# Patient Record
Sex: MALE | Race: WHITE | NOT HISPANIC OR LATINO | URBAN - METROPOLITAN AREA
[De-identification: names, ages, dates, MRNs, and addresses within clinical notes are randomized per-mention and may not be internally consistent; named-entity substitution may affect disease eponyms.]

---
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
## 2022-03-04 ENCOUNTER — OFFICE VISIT (OUTPATIENT)
Dept: MEDICAL GROUP | Facility: CLINIC | Age: 22
End: 2022-03-04
Payer: COMMERCIAL

## 2022-03-04 VITALS
BODY MASS INDEX: 25.2 KG/M2 | HEIGHT: 71 IN | HEART RATE: 66 BPM | RESPIRATION RATE: 16 BRPM | DIASTOLIC BLOOD PRESSURE: 79 MMHG | WEIGHT: 180 LBS | SYSTOLIC BLOOD PRESSURE: 122 MMHG | OXYGEN SATURATION: 96 % | TEMPERATURE: 98 F

## 2022-03-04 DIAGNOSIS — F41.9 ANXIETY: ICD-10-CM

## 2022-03-04 PROCEDURE — 99203 OFFICE O/P NEW LOW 30 MIN: CPT | Performed by: FAMILY MEDICINE

## 2022-03-04 ASSESSMENT — PATIENT HEALTH QUESTIONNAIRE - PHQ9
5. POOR APPETITE OR OVEREATING: 1 - SEVERAL DAYS
CLINICAL INTERPRETATION OF PHQ2 SCORE: 4
SUM OF ALL RESPONSES TO PHQ QUESTIONS 1-9: 8

## 2022-03-04 NOTE — ASSESSMENT & PLAN NOTE
After discussion of risks and benefits, have decided to start sertraline 50 mg 1 p.o. daily.  I offered a referral to psychology as well, he would like to wait.  He may look into it on his own and I recommended [facility].  Follow-up in 1 to 2 months, call sooner if new issues or concerns.    I did also discuss aerobic exercise, diet and cautioned about self-medication.

## 2022-03-04 NOTE — PROGRESS NOTES
"Subjective:     CC:  The encounter diagnosis was Anxiety.    HISTORY OF THE PRESENT ILLNESS: Patient is a 21 y.o. male. This pleasant patient is here today to establish care and discuss the following issue:.       Problem   Anxiety    Longstanding history of anxiety.  This seems to run in his family as well.  He sought help last year and was treated first with fluoxetine, then with what sounds like bupropion.  These worked reasonably well but did affect his sleep and he eventually stopped them.  Now is noticing more anxiety and would like to discuss treatment again.  He did have therapy as well previously and found that somewhat helpful  Appetite is reasonable.  No SI or HI.  Social alcohol, occasional marijuana.    Family Hx:   Sister: Crohns, depression  Mother: anemia, anxiety           Current Outpatient Medications Ordered in Epic   Medication Sig Dispense Refill   • sertraline (ZOLOFT) 50 MG Tab Take 1 Tablet by mouth every day. 30 Tablet 11     No current [facility]-ordered facility-administered medications on file.       Health Maintenance:     ROS:   Gen: no fevers/chills, no changes in weight  Eyes: no changes in vision  ENT: no sore throat, no hearing loss, no bloody nose  Pulm: no sob, no cough  CV: no chest pain, no palpitations  GI: no nausea/vomiting, no diarrhea  : no dysuria  MSk: no myalgias  Skin: no rash  Neuro: no headaches, no numbness/tingling  Heme/Lymph: no easy bruising      Objective:       Exam: /79   Pulse 66   Temp 36.7 °C (98 °F)   Resp 16   Ht 1.803 m (5' 11\")   Wt 81.6 kg (180 lb)   SpO2 96%  Body mass index is 25.1 kg/m².    General: Normal appearing. No distress.  HEENT: Normocephalic. Eyes conjunctiva clear lids without ptosis, pupils equal and reactive to light accommodation, ears normal shape and contour, canals are clear bilaterally, tympanic membranes are benign, nasal mucosa benign, oropharynx is without erythema, edema or exudates.   Neck: Supple without JVD or " bruit. Thyroid is not enlarged.  Pulmonary: Clear to ausculation.  Normal effort. No rales, ronchi, or wheezing.  Cardiovascular: Regular rate and rhythm without murmur. Carotid and radial pulses are intact and equal bilaterally.  Abdomen: Soft, nontender, nondistended. Normal bowel sounds. Liver and spleen are not palpable  Neurologic: Grossly nonfocal  Lymph: No cervical or supraclavicular lymph nodes are palpable  Skin: Warm and dry.  No obvious lesions.  Musculoskeletal: Normal gait. No extremity cyanosis, clubbing, or edema.  Psych: Normal mood and affect. Alert and oriented x3. Judgment and insight is normal.              Assessment & Plan:   21 y.o. male with the following -    Problem List Items Addressed This Visit     Anxiety     After discussion of risks and benefits, have decided to start sertraline 50 mg 1 p.o. daily.  I offered a referral to psychology as well, he would like to wait.  He may look into it on his own and I recommended [place] [facility].  Follow-up in 1 to 2 months, call sooner if new issues or concerns.    I did also discuss aerobic exercise, diet and cautioned about self-medication.         Relevant Medications    sertraline (ZOLOFT) 50 MG Tab              No follow-ups on file.    Please note that this dictation was created using voice recognition software. I have made every reasonable attempt to correct obvious errors, but I expect that there are errors of grammar and possibly content that I did not discover before finalizing the note.

## 2022-03-10 NOTE — PROGRESS NOTES
Results Reviewed and can further be discussed at 2/20 clinic visit. Please let the patient know that diabetes is well controlled, A1C is at goal of <7%. Kidney function is stable at 53% and we will discuss further management when I see her. Hgb/blood count, magnesium, phosphorus, fasting cholesterol 90, urine protein levels, PTH/bone hormone levels are at goal. Follow up as scheduled on 2/20 to discuss results Subjective:   Al Badillo is a 19 y.o. male who presents for Nasal Congestion (cough, dry, fever, chills x 2 days)        Patient presents with sudden onset of dry cough, fevers, chills, headaches x2 days.    Cough   This is a new problem. The current episode started in the past 7 days. The problem has been unchanged. The problem occurs constantly. The cough is non-productive. Associated symptoms include chills, a fever, headaches, myalgias, nasal congestion, postnasal drip, rhinorrhea and a sore throat. Pertinent negatives include no chest pain, ear congestion, ear pain, shortness of breath, weight loss or wheezing. Nothing aggravates the symptoms. Treatments tried: rest, fluids, nsaids. There is no history of asthma, bronchitis, COPD or environmental allergies.     Review of Systems   Constitutional: Positive for chills and fever. Negative for weight loss.   HENT: Positive for postnasal drip, rhinorrhea and sore throat. Negative for ear pain.    Respiratory: Positive for cough. Negative for shortness of breath and wheezing.    Cardiovascular: Negative for chest pain.   Musculoskeletal: Positive for myalgias.   Neurological: Positive for headaches.   Endo/Heme/Allergies: Negative for environmental allergies.       PMH:  has no past medical history of Asthma.  MEDS:   Current Outpatient Medications:   •  oseltamivir (TAMIFLU) 75 MG Cap, Take 1 Cap by mouth 2 times a day., Disp: 10 Cap, Rfl: 0  •  guaiFENesin ER (MUCINEX) 600 MG TABLET SR 12 HR, Take 1 Tab by mouth every 12 hours., Disp: 28 Tab, Rfl: 0  •  fluticasone (FLONASE) 50 MCG/ACT nasal spray, Spray 1 Spray in nose every day., Disp: 16 g, Rfl: 0  •  cetirizine (ZYRTEC) 10 MG Tab, Take 10 mg by mouth every day., Disp: , Rfl:   ALLERGIES:   Allergies   Allergen Reactions   • Amoxicillin Rash     rash     SURGHX: History reviewed. No pertinent surgical history.  SOCHX:  reports that he has never smoked. He has never used smokeless tobacco. He reports  "that he does not drink alcohol or use drugs.  FH: Family history was reviewed, no pertinent findings to report   Objective:   /70 (BP Location: Left arm, Patient Position: Sitting, BP Cuff Size: Adult)   Pulse 71   Temp 37.2 °C (98.9 °F) (Tympanic)   Ht 1.803 m (5' 11\")   Wt 78.9 kg (174 lb)   SpO2 95%   BMI 24.27 kg/m²   Physical Exam  Vitals signs reviewed.   Constitutional:       General: He is not in acute distress.     Appearance: Normal appearance. He is well-developed. He is not toxic-appearing.   HENT:      Head: Normocephalic and atraumatic.      Right Ear: Tympanic membrane, ear canal and external ear normal.      Left Ear: Tympanic membrane, ear canal and external ear normal.      Nose: Mucosal edema and rhinorrhea present. No congestion. Rhinorrhea is clear.      Mouth/Throat:      Lips: Pink.      Mouth: Mucous membranes are moist.      Pharynx: Oropharynx is clear. Uvula midline. Posterior oropharyngeal erythema present.   Eyes:      General: Lids are normal.      Conjunctiva/sclera: Conjunctivae normal.   Neck:      Musculoskeletal: Neck supple.   Cardiovascular:      Rate and Rhythm: Normal rate and regular rhythm.      Heart sounds: Normal heart sounds, S1 normal and S2 normal. No murmur. No friction rub. No gallop.    Pulmonary:      Effort: Pulmonary effort is normal. No respiratory distress.      Breath sounds: Normal breath sounds. No decreased breath sounds, wheezing, rhonchi or rales.   Lymphadenopathy:      Cervical: No cervical adenopathy.      Right cervical: No superficial or posterior cervical adenopathy.     Left cervical: No superficial or posterior cervical adenopathy.   Skin:     General: Skin is warm and dry.      Capillary Refill: Capillary refill takes less than 2 seconds.   Neurological:      Mental Status: He is alert and oriented to person, place, and time.      Cranial Nerves: No cranial nerve deficit.      Sensory: No sensory deficit.   Psychiatric:         Speech: " Speech normal.         Behavior: Behavior normal.         Thought Content: Thought content normal.         Judgment: Judgment normal.           Assessment/Plan:   1. Flu-like symptoms  - oseltamivir (TAMIFLU) 75 MG Cap; Take 1 Cap by mouth 2 times a day.  Dispense: 10 Cap; Refill: 0  - guaiFENesin ER (MUCINEX) 600 MG TABLET SR 12 HR; Take 1 Tab by mouth every 12 hours.  Dispense: 28 Tab; Refill: 0  - fluticasone (FLONASE) 50 MCG/ACT nasal spray; Spray 1 Spray in nose every day.  Dispense: 16 g; Refill: 0    2. Fever and chills  - POCT Influenza A/B    Pt presentation consistent with influenza, high prevalence in community currently, and clinical suspicion is high.    VSS, no dyspnea, no SOB, and lungs CTA on PE.  Goals of care include symptomatic control and prevention of lower respiratory spread. Signs of lower respiratory involvement discussed with pt.  Pt instructed to RTC if any of these are observed.     Drink plenty of fluids and rest.  Use nasal saline TID to promote drainage.   Salt water gurgles to soothe sore throat.  Start OTC expectorant.  APAP for fever control, and NSAIDs for throat pain/headache relief prn.    If you fail to improve in 3-5 days or symptoms worsen/new symptoms develop, RTC for reevaluation.    If symptoms worsen or new symptoms develop to include uncontrolled fevers, dyspnea, SOB, or vomiting- pt was instructed to go to the ED for evaluation.    Differential diagnosis, natural history, supportive care, and indications for immediate follow-up discussed.   none

## 2022-04-13 ENCOUNTER — OFFICE VISIT (OUTPATIENT)
Dept: MEDICAL GROUP | Facility: CLINIC | Age: 22
End: 2022-04-13
Payer: COMMERCIAL

## 2022-04-13 VITALS
WEIGHT: 181.6 LBS | HEIGHT: 72 IN | TEMPERATURE: 97.8 F | HEART RATE: 70 BPM | RESPIRATION RATE: 16 BRPM | DIASTOLIC BLOOD PRESSURE: 70 MMHG | BODY MASS INDEX: 24.6 KG/M2 | OXYGEN SATURATION: 98 % | SYSTOLIC BLOOD PRESSURE: 118 MMHG

## 2022-04-13 DIAGNOSIS — F41.9 ANXIETY: ICD-10-CM

## 2022-04-13 PROCEDURE — 99213 OFFICE O/P EST LOW 20 MIN: CPT | Performed by: FAMILY MEDICINE

## 2022-04-13 NOTE — ASSESSMENT & PLAN NOTE
He is happy with the current medicine at the existing dose.  He is welcome to check back with me through my chart if that changes otherwise I should see him at least twice a year.  I did review with him as well that if he wants to stop he should check in with me and we will taper it.  There are no other screening or preventive issues I would address right now.  He does not feel any need for psychology/counseling but we will reapproach that if necessary.  His lifestyle choices seem healthy as well.

## 2022-04-13 NOTE — PROGRESS NOTES
Subjective:     CC: Anxiety, med management    HPI:   Al presents today to discuss the following issues     Problem   Anxiety    Al feels better on the 50 mg of sertraline.  He states that the change is not dramatic, but that the sense of restlessness and underlying unease is improved.  His sleep is good, and his appetite is acceptable.  He thinks maybe he should eat a bit more than he does but that his diet is pretty reasonable his weight is steady.  My prior note:   Longstanding history of anxiety.  This seems to run in his family as well.  He sought help last year and was treated first with fluoxetine, then with what sounds like bupropion.  These worked reasonably well but did affect his sleep and he eventually stopped them.  Now is noticing more anxiety and would like to discuss treatment again.  He did have therapy as well previously and found that somewhat helpful  Appetite is reasonable.  No SI or HI.  Social alcohol, occasional marijuana.               Current Outpatient Medications Ordered in Epic   Medication Sig Dispense Refill   • sertraline (ZOLOFT) 50 MG Tab TAKE 1 TABLET BY MOUTH EVERY DAY 90 Tablet 3     No current Epic-ordered facility-administered medications on file.       Health Maintenance:     ROS:  Gen: no fevers/chills, no changes in weight  Eyes: no changes in vision  ENT: no sore throat, no hearing loss, no bloody nose  Pulm: no sob, no cough  CV: no chest pain, no palpitations  GI: no nausea/vomiting, no diarrhea  : no dysuria  MSk: no myalgias  Skin: no rash  Neuro: no headaches, no numbness/tingling  Heme/Lymph: no easy bruising      Objective:     Exam:  /70 (BP Location: Right arm, Patient Position: Sitting, BP Cuff Size: Adult)   Pulse 70   Temp 36.6 °C (97.8 °F) (Temporal)   Resp 16   Ht 1.829 m (6')   Wt 82.4 kg (181 lb 9.6 oz)   SpO2 98%   BMI 24.63 kg/m²  Body mass index is 24.63 kg/m².    Gen: Alert and oriented, No apparent distress.  Neck: Neck is supple  without lymphadenopathy.  Lungs: Normal effort, CTA bilaterally, no wheezes, rhonchi, or rales  CV: Regular rate and rhythm. No murmurs, rubs, or gallops.  Ext: No clubbing, cyanosis, edema.          Assessment & Plan:     21 y.o. male with the following -     Problem List Items Addressed This Visit     Anxiety     He is happy with the current medicine at the existing dose.  He is welcome to check back with me through my chart if that changes otherwise I should see him at least twice a year.  I did review with him as well that if he wants to stop he should check in with me and we will taper it.  There are no other screening or preventive issues I would address right now.  He does not feel any need for psychology/counseling but we will reapproach that if necessary.  His lifestyle choices seem healthy as well.                   No follow-ups on file.

## 2023-03-08 RX ORDER — SERTRALINE HYDROCHLORIDE 100 MG/1
100 TABLET, FILM COATED ORAL DAILY
Qty: 30 TABLET | Refills: 11 | Status: SHIPPED | OUTPATIENT
Start: 2023-03-08 | End: 2023-03-31

## 2023-03-30 NOTE — TELEPHONE ENCOUNTER
REQUEST FOR 90 DAYS PRESCRIPTION.      Received request via: Pharmacy    Was the patient seen in the last year in this department? Yes    Does the patient have an active prescription (recently filled or refills available) for medication(s) requested? No    Does the patient have [facility] and need 100 day supply (blood pressure, diabetes and cholesterol meds only)? Patient does not have SCP

## 2023-03-31 RX ORDER — SERTRALINE HYDROCHLORIDE 100 MG/1
100 TABLET, FILM COATED ORAL DAILY
Qty: 90 TABLET | Refills: 3 | Status: SHIPPED | OUTPATIENT
Start: 2023-03-31

## 2023-05-31 ENCOUNTER — OFFICE VISIT (OUTPATIENT)
Dept: MEDICAL GROUP | Facility: CLINIC | Age: 23
End: 2023-05-31
Payer: COMMERCIAL

## 2023-05-31 DIAGNOSIS — Z00.00 HEALTH CARE MAINTENANCE: ICD-10-CM

## 2023-05-31 DIAGNOSIS — F41.9 ANXIETY: ICD-10-CM

## 2023-05-31 PROCEDURE — 99213 OFFICE O/P EST LOW 20 MIN: CPT | Performed by: FAMILY MEDICINE

## 2023-05-31 NOTE — ASSESSMENT & PLAN NOTE
I reviewed care gaps and USPSTF preventative screening guidelines.  I am not recommending labs or other screening measures now but we will continue to monitor with him.  Follow-up with me if any new concerns in the meantime.

## 2023-05-31 NOTE — ASSESSMENT & PLAN NOTE
After discussion, we agreed that it would be appropriate to cut back on the sertraline to 50 mg a day.  I would stay on this dose until he establishes with his psychologist.  Discussion with her, he may go ahead and either taper it off over the following month or check back with me if he wants to continue.  He is making positive lifestyle efforts otherwise and I will continue to monitor with him.  Hold on a trial of a different antidepressant for now.

## 2023-05-31 NOTE — PROGRESS NOTES
Subjective:     CC: check up, anxiety    HPI:   Al presents today to discuss the following issues       Problem   Health Care Maintenance    Al is interested in knowing if there are any other preventive or screening measures that should be taken now.  His past medical history is otherwise benign as is his family history.  No new or concerning physical symptoms       Anxiety    Increased Al is sertraline too 100 mg in March at his request.  Not certain this is helping, in fact he feels that some of the edge of anxiety may be a little worse on the medications.  Previously been on fluoxetine and bupropion years ago and did not really tolerate these medicines either.  He wonders about stopping the Zoloft.  He does have a ointment scheduled with a psychologist in about 2 weeks.  Sleeping well, his diet is good he gets good exercise.  He smokes occasional marijuana but no other drugs and no alcohol.  There is no SI or HI    Prior    Al feels better on the 50 mg of sertraline.  He states that the change is not dramatic, but that the sense of restlessness and underlying unease is improved.  His sleep is good, and his appetite is acceptable.  He thinks maybe he should eat a bit more than he does but that his diet is pretty reasonable his weight is steady.  My prior note:   Longstanding history of anxiety.  This seems to run in his family as well.  He sought help last year and was treated first with fluoxetine, then with what sounds like bupropion.  These worked reasonably well but did affect his sleep and he eventually stopped them.  Now is noticing more anxiety and would like to discuss treatment again.  He did have therapy as well previously and found that somewhat helpful  Appetite is reasonable.  No SI or HI.  Social alcohol, occasional marijuana.                 Current Outpatient Medications Ordered in Epic   Medication Sig Dispense Refill    sertraline (ZOLOFT) 100 MG Tab TAKE 1 TABLET BY MOUTH EVERY DAY  "90 Tablet 3     No current Epic-ordered facility-administered medications on file.       Health Maintenance:     ROS:  Gen: no fevers/chills, no changes in weight  Eyes: no changes in vision  ENT: no sore throat, no hearing loss, no bloody nose  Pulm: no sob, no cough  CV: no chest pain, no palpitations  GI: no nausea/vomiting, no diarrhea  : no dysuria  MSk: no myalgias  Skin: no rash  Neuro: no headaches, no numbness/tingling  Heme/Lymph: no easy bruising      Objective:     Exam:  BP (P) 118/72 (BP Location: Left arm, Patient Position: Sitting, BP Cuff Size: Adult)   Pulse (P) 68   Temp (P) 36.2 °C (97.2 °F) (Temporal)   Ht (P) 1.803 m (5' 11\")   Wt (P) 79.5 kg (175 lb 3.2 oz)   SpO2 (P) 98%   BMI (P) 24.44 kg/m²  Body mass index is 24.44 kg/m² (pended).    Gen: Alert and oriented, No apparent distress.  Neck: Neck is supple without lymphadenopathy.  Lungs: Normal effort, CTA bilaterally, no wheezes, rhonchi, or rales  CV: Regular rate and rhythm. No murmurs, rubs, or gallops.  Ext: No clubbing, cyanosis, edema.          Assessment & Plan:     22 y.o. male with the following -     Problem List Items Addressed This Visit       Anxiety     After discussion, we agreed that it would be appropriate to cut back on the sertraline to 50 mg a day.  I would stay on this dose until he establishes with his psychologist.  Discussion with her, he may go ahead and either taper it off over the following month or check back with me if he wants to continue.  He is making positive lifestyle efforts otherwise and I will continue to monitor with him.  Hold on a trial of a different antidepressant for now.           Health care maintenance     I reviewed care gaps and USPSTF preventative screening guidelines.  I am not recommending labs or other screening measures now but we will continue to monitor with him.  Follow-up with me if any new concerns in the meantime.                    No follow-ups on file.            "

## 2023-12-01 ENCOUNTER — APPOINTMENT (OUTPATIENT)
Dept: MEDICAL GROUP | Facility: CLINIC | Age: 23
End: 2023-12-01
Payer: COMMERCIAL

## 2023-12-14 ENCOUNTER — OFFICE VISIT (OUTPATIENT)
Dept: URGENT CARE | Facility: CLINIC | Age: 23
End: 2023-12-14
Payer: COMMERCIAL

## 2023-12-14 VITALS
BODY MASS INDEX: 23.7 KG/M2 | OXYGEN SATURATION: 98 % | HEART RATE: 76 BPM | HEIGHT: 72 IN | TEMPERATURE: 97.3 F | DIASTOLIC BLOOD PRESSURE: 70 MMHG | WEIGHT: 175 LBS | SYSTOLIC BLOOD PRESSURE: 120 MMHG

## 2023-12-14 DIAGNOSIS — J10.1 INFLUENZA A: ICD-10-CM

## 2023-12-14 LAB
FLUAV RNA SPEC QL NAA+PROBE: POSITIVE
FLUBV RNA SPEC QL NAA+PROBE: NEGATIVE
RSV RNA SPEC QL NAA+PROBE: NEGATIVE
SARS-COV-2 RNA RESP QL NAA+PROBE: NEGATIVE

## 2023-12-14 PROCEDURE — 3078F DIAST BP <80 MM HG: CPT | Performed by: PHYSICIAN ASSISTANT

## 2023-12-14 PROCEDURE — 3074F SYST BP LT 130 MM HG: CPT | Performed by: PHYSICIAN ASSISTANT

## 2023-12-14 PROCEDURE — 99213 OFFICE O/P EST LOW 20 MIN: CPT | Performed by: PHYSICIAN ASSISTANT

## 2023-12-14 PROCEDURE — 0241U POCT CEPHEID COV-2, FLU A/B, RSV - PCR: CPT | Performed by: PHYSICIAN ASSISTANT

## 2023-12-14 RX ORDER — DEXTROMETHORPHAN HYDROBROMIDE AND PROMETHAZINE HYDROCHLORIDE 15; 6.25 MG/5ML; MG/5ML
5 SYRUP ORAL EVERY 4 HOURS PRN
Qty: 120 ML | Refills: 0 | Status: SHIPPED | OUTPATIENT
Start: 2023-12-14

## 2023-12-14 RX ORDER — OSELTAMIVIR PHOSPHATE 75 MG/1
75 CAPSULE ORAL 2 TIMES DAILY
Qty: 10 CAPSULE | Refills: 0 | Status: SHIPPED | OUTPATIENT
Start: 2023-12-14 | End: 2023-12-19

## 2023-12-14 RX ORDER — OSELTAMIVIR PHOSPHATE 75 MG/1
75 CAPSULE ORAL 2 TIMES DAILY
Qty: 10 CAPSULE | Refills: 0 | Status: SHIPPED | OUTPATIENT
Start: 2023-12-14 | End: 2023-12-14 | Stop reason: SDUPTHER

## 2023-12-14 RX ORDER — DEXTROMETHORPHAN HYDROBROMIDE AND PROMETHAZINE HYDROCHLORIDE 15; 6.25 MG/5ML; MG/5ML
5 SYRUP ORAL EVERY 4 HOURS PRN
Qty: 120 ML | Refills: 0 | Status: SHIPPED | OUTPATIENT
Start: 2023-12-14 | End: 2023-12-14 | Stop reason: SDUPTHER

## 2023-12-14 ASSESSMENT — ENCOUNTER SYMPTOMS
SHORTNESS OF BREATH: 0
HEADACHES: 1
ABDOMINAL PAIN: 0
COUGH: 1
VOMITING: 0
WHEEZING: 0
CHILLS: 1
NAUSEA: 0
DIARRHEA: 0
FEVER: 1
MYALGIAS: 1

## 2023-12-14 NOTE — LETTER
[facility] [place] [id number] [facility] [id number]     December 14, 2023    Patient: Al Badillo   YOB: 2000   Date of Visit: 12/14/2023       To Whom It May Concern:    Al Badillo was seen and treated in our department on 12/14/2023.  He should be excused from missed work for this Friday and Saturday.    Sincerely,     Raymond Foote P.A.-C.

## 2023-12-15 NOTE — PROGRESS NOTES
Subjective:     Al Badillo  is a 23 y.o. male who presents for Influenza (102.5 fever /Congested and cough started about 2 days ago /Headache, chills)      Influenza  This is a new problem. The current episode started yesterday. Associated symptoms include chills, coughing, a fever, headaches and myalgias. Pertinent negatives include no abdominal pain, nausea, rash or vomiting.   Patient presents urgent care noting 2 days of cough congestion and fever.  Complains of chills associated with fever.  Denies sore throat.  Notes mild ear discomfort.   states he coughed hard enough that he nearly felt like vomiting but denies nausea.  Patient denies history of adulthood pneumonia but suspects he may have had pneumonia at a younger age.  Has had COVID in the past.  Has been treating with OTC Advil.  Requests a prescription of Tamiflu.    Review of Systems   Constitutional:  Positive for chills and fever.   Respiratory:  Positive for cough. Negative for shortness of breath and wheezing.    Gastrointestinal:  Negative for abdominal pain, diarrhea, nausea and vomiting.   Musculoskeletal:  Positive for myalgias.   Skin:  Negative for rash.   Neurological:  Positive for headaches.       Medications:    sertraline Tabs    Allergies: Amoxicillin    Problem List: Al Badillo does not have any pertinent problems on file.    Surgical History:  No past surgical history on file.    Past Social Hx: Al Badillo  reports that he has never smoked. He has never used smokeless tobacco. He reports that he does not drink alcohol and does not use drugs.     Past Family Hx:  Al Badillo family history is not on file.     Problem list, medications, and allergies reviewed by myself today in Epic.     Objective:   /70 (BP Location: Left arm, Patient Position: Sitting, BP Cuff Size: Adult)   Pulse 76   Temp 36.3 °C (97.3 °F) (Temporal)   Ht 1.829 m (6')   Wt 79.4 kg (175 lb)   SpO2 98%   BMI 23.73 kg/m²      Physical Exam  Vitals and nursing note reviewed.   Constitutional:       General: He is not in acute distress.     Appearance: Normal appearance. He is well-developed. He is not diaphoretic.   HENT:      Head: Normocephalic and atraumatic.      Right Ear: Tympanic membrane, ear canal and external ear normal.      Left Ear: Tympanic membrane, ear canal and external ear normal.      Nose: Nose normal.      Mouth/Throat:      Mouth: Mucous membranes are moist.      Pharynx: Uvula midline. Posterior oropharyngeal erythema ( mild PND) present. No oropharyngeal exudate.      Tonsils: No tonsillar abscesses.   Eyes:      General: No scleral icterus.        Right eye: No discharge.         Left eye: No discharge.      Conjunctiva/sclera: Conjunctivae normal.   Pulmonary:      Effort: Pulmonary effort is normal. No respiratory distress.      Breath sounds: Normal breath sounds. No stridor. No decreased breath sounds, wheezing, rhonchi or rales.   Musculoskeletal:         General: Normal range of motion.      Cervical back: Neck supple.   Skin:     General: Skin is warm and dry.      Coloration: Skin is not pale.   Neurological:      Mental Status: He is alert and oriented to person, place, and time.      Coordination: Coordination normal.     Point-of-care test for COVID RSV and influenza is positive for influenza A    Assessment/Plan:   Assessment      1. Influenza A  - POCT CoV-2, Flu A/B, RSV by PCR  - oseltamivir (TAMIFLU) 75 MG Cap; Take 1 Capsule by mouth 2 times a day for 5 days.  Dispense: 10 Capsule; Refill: 0  - promethazine-dextromethorphan (PROMETHAZINE-DM) 6.25-15 MG/5ML syrup; Take 5 mL by mouth every four hours as needed for Cough.  Dispense: 120 mL; Refill: 0  Supportive care is reviewed with patient/caregiver - recommend to push PO fluids and electrolytes, Tamiflu cough suppressant sent to pharmacy, OTC fever reducers  Return to clinic with lack of resolution or progression of symptoms.      I have worn  an N95 mask, gloves and eye protection for the entire encounter with this patient.     Differential diagnosis, natural history, supportive care, and indications for immediate follow-up discussed.

## 2023-12-27 ENCOUNTER — APPOINTMENT (OUTPATIENT)
Dept: RADIOLOGY | Facility: IMAGING CENTER | Age: 23
End: 2023-12-27
Payer: COMMERCIAL

## 2023-12-27 ENCOUNTER — OFFICE VISIT (OUTPATIENT)
Dept: URGENT CARE | Facility: CLINIC | Age: 23
End: 2023-12-27
Payer: COMMERCIAL

## 2023-12-27 VITALS
WEIGHT: 175 LBS | DIASTOLIC BLOOD PRESSURE: 64 MMHG | TEMPERATURE: 97.4 F | HEIGHT: 72 IN | BODY MASS INDEX: 23.7 KG/M2 | SYSTOLIC BLOOD PRESSURE: 102 MMHG | RESPIRATION RATE: 14 BRPM | HEART RATE: 90 BPM | OXYGEN SATURATION: 94 %

## 2023-12-27 DIAGNOSIS — R50.9 FEVER, UNSPECIFIED FEVER CAUSE: ICD-10-CM

## 2023-12-27 LAB
FLUAV RNA SPEC QL NAA+PROBE: NEGATIVE
FLUBV RNA SPEC QL NAA+PROBE: NEGATIVE
RSV RNA SPEC QL NAA+PROBE: NEGATIVE
S PYO DNA SPEC NAA+PROBE: NOT DETECTED
SARS-COV-2 RNA RESP QL NAA+PROBE: NEGATIVE

## 2023-12-27 PROCEDURE — 3078F DIAST BP <80 MM HG: CPT

## 2023-12-27 PROCEDURE — 71046 X-RAY EXAM CHEST 2 VIEWS: CPT | Mod: TC | Performed by: RADIOLOGY

## 2023-12-27 PROCEDURE — 3074F SYST BP LT 130 MM HG: CPT

## 2023-12-27 PROCEDURE — 99213 OFFICE O/P EST LOW 20 MIN: CPT

## 2023-12-27 PROCEDURE — 0241U POCT CEPHEID COV-2, FLU A/B, RSV - PCR: CPT

## 2023-12-27 PROCEDURE — 87651 STREP A DNA AMP PROBE: CPT

## 2023-12-27 RX ORDER — FLUTICASONE PROPIONATE 50 MCG
1 SPRAY, SUSPENSION (ML) NASAL DAILY
Qty: 16 G | Refills: 0 | Status: SHIPPED | OUTPATIENT
Start: 2023-12-27

## 2023-12-27 RX ORDER — PREDNISONE 20 MG/1
20 TABLET ORAL 2 TIMES DAILY
Qty: 6 TABLET | Refills: 0 | Status: SHIPPED | OUTPATIENT
Start: 2023-12-27 | End: 2023-12-30

## 2023-12-27 RX ORDER — BENZONATATE 100 MG/1
100 CAPSULE ORAL 3 TIMES DAILY PRN
Qty: 30 CAPSULE | Refills: 0 | Status: SHIPPED | OUTPATIENT
Start: 2023-12-27

## 2023-12-27 ASSESSMENT — ENCOUNTER SYMPTOMS
FEVER: 1
CHILLS: 1
SORE THROAT: 0
SINUS PAIN: 0
COUGH: 1
ABDOMINAL PAIN: 0
SHORTNESS OF BREATH: 1
MYALGIAS: 1
NAUSEA: 0
VOMITING: 0

## 2023-12-27 NOTE — PROGRESS NOTES
Chief Complaint   Patient presents with    Influenza     Pt states was last seen 12/14/23, fever, chills, body aches, tested + FLU A        HISTORY OF PRESENT ILLNESS: Patient is a pleasant 23 y.o. male who presents to urgent care today patient was seen on 12/14 and diagnosed with influenza A.  He was placed on Tamiflu and he states that he was getting better.  2 days ago he notes acute onset of fever with increasing cough and symptoms.  Patient has a history of pneumonia, he is concerned he may have developed this.  Patient does not smoke, no history related otherwise.    Patient Active Problem List    Diagnosis Date Noted    Health care maintenance 05/31/2023    Anxiety 03/04/2022       Allergies:Amoxicillin    Current Outpatient Medications Ordered in Epic   Medication Sig Dispense Refill    predniSONE (DELTASONE) 20 MG Tab Take 1 Tablet by mouth 2 times a day for 3 days. 6 Tablet 0    fluticasone (FLONASE) 50 MCG/ACT nasal spray Administer 1 Spray into affected nostril(S) every day. 16 g 0    benzonatate (TESSALON) 100 MG Cap Take 1 Capsule by mouth 3 times a day as needed for Cough. 30 Capsule 0    promethazine-dextromethorphan (PROMETHAZINE-DM) 6.25-15 MG/5ML syrup Take 5 mL by mouth every four hours as needed for Cough. 120 mL 0    sertraline (ZOLOFT) 100 MG Tab TAKE 1 TABLET BY MOUTH EVERY DAY (Patient not taking: Reported on 12/14/2023) 90 Tablet 3     No current Epic-ordered facility-administered medications on file.       No past medical history on file.    Social History     Tobacco Use    Smoking status: Never    Smokeless tobacco: Never   Vaping Use    Vaping Use: Every day    Substances: Nicotine    Devices: Disposable   Substance Use Topics    Alcohol use: No     Comment: occ    Drug use: No       No family status information on file.   No family history on file.    Review of Systems   Constitutional:  Positive for chills, fever and malaise/fatigue.   HENT:  Positive for congestion. Negative for  sinus pain and sore throat.    Respiratory:  Positive for cough and shortness of breath.    Gastrointestinal:  Negative for abdominal pain, nausea and vomiting.   Musculoskeletal:  Positive for myalgias.       Exam:  /64 (BP Location: Left arm, Patient Position: Sitting, BP Cuff Size: Adult)   Pulse 90   Temp 36.3 °C (97.4 °F) (Temporal)   Resp 14   Ht 1.829 m (6')   Wt 79.4 kg (175 lb)   SpO2 94%   Physical Exam  Constitutional:       Appearance: Normal appearance.   HENT:      Head: Normocephalic.      Right Ear: Tympanic membrane and ear canal normal. There is no impacted cerumen. Tympanic membrane is not injected or erythematous.      Left Ear: Tympanic membrane and ear canal normal. There is no impacted cerumen. Tympanic membrane is not injected or erythematous.      Nose: Congestion and rhinorrhea present. Rhinorrhea is clear.      Mouth/Throat:      Mouth: Mucous membranes are moist.      Pharynx: Oropharynx is clear. No oropharyngeal exudate or posterior oropharyngeal erythema.      Tonsils: No tonsillar exudate. 0 on the right. 0 on the left.   Eyes:      General:         Right eye: No discharge.         Left eye: No discharge.      Extraocular Movements: Extraocular movements intact.      Conjunctiva/sclera: Conjunctivae normal.      Pupils: Pupils are equal, round, and reactive to light.   Cardiovascular:      Rate and Rhythm: Normal rate and regular rhythm.      Pulses: Normal pulses.      Heart sounds: Normal heart sounds. No murmur heard.  Pulmonary:      Effort: Pulmonary effort is normal. No respiratory distress.      Breath sounds: Normal breath sounds. No stridor. No wheezing or rhonchi.      Comments: Positive congested cough  Chest:      Chest wall: No tenderness.   Musculoskeletal:         General: No swelling, tenderness, deformity or signs of injury. Normal range of motion.      Cervical back: Normal range of motion.      Right lower leg: No edema.      Left lower leg: No edema.    Lymphadenopathy:      Cervical: No cervical adenopathy.   Skin:     General: Skin is warm and dry.      Capillary Refill: Capillary refill takes less than 2 seconds.      Findings: No bruising or rash.   Neurological:      General: No focal deficit present.      Mental Status: He is alert.      Sensory: No sensory deficit.      Motor: No weakness.      Coordination: Coordination normal.      Gait: Gait normal.   Psychiatric:         Mood and Affect: Mood normal.         Behavior: Behavior normal.         Thought Content: Thought content normal.         Judgment: Judgment normal.         Assessment/Plan:  1. Fever, unspecified fever cause  - DX-CHEST-2 VIEWS; Future  - POCT CoV-2, Flu A/B, RSV by PCR  - predniSONE (DELTASONE) 20 MG Tab; Take 1 Tablet by mouth 2 times a day for 3 days.  Dispense: 6 Tablet; Refill: 0  - fluticasone (FLONASE) 50 MCG/ACT nasal spray; Administer 1 Spray into affected nostril(S) every day.  Dispense: 16 g; Refill: 0  - benzonatate (TESSALON) 100 MG Cap; Take 1 Capsule by mouth 3 times a day as needed for Cough.  Dispense: 30 Capsule; Refill: 0  - POCT GROUP A STREP, PCR    Based on patient's physical presentation along with review of systems I did go ahead and order a chest x-ray to ensure that patient does not have pneumonia.  POCT COVID and flu were completed as patient may have a coinfection with either COVID or RSV.  Patient's vitals appear stable today here in the office, lung sounds are clear but diminished.    Supportive care, differential diagnoses, and indications for immediate follow-up discussed with patient.   Pathogenesis of diagnosis discussed including typical length and natural progression.   Instructed to return to clinic or nearest emergency department for any change in condition, further concerns, or worsening of symptoms.  Patient states understanding of the plan of care and discharge instructions.  Instructed to make an appointment, for follow up, with primary care  provider.      Please note that this dictation was created using voice recognition software. I have made every reasonable attempt to correct obvious errors, but I expect that there are errors of grammar and possibly content that I did not discover before finalizing the note.      Gabi CAZARES